# Patient Record
Sex: FEMALE | Race: WHITE | Employment: FULL TIME | URBAN - METROPOLITAN AREA
[De-identification: names, ages, dates, MRNs, and addresses within clinical notes are randomized per-mention and may not be internally consistent; named-entity substitution may affect disease eponyms.]

---

## 2022-01-31 DIAGNOSIS — Z86.39 HISTORY OF VITAMIN D DEFICIENCY: ICD-10-CM

## 2022-01-31 DIAGNOSIS — Z76.89 ENCOUNTER TO ESTABLISH CARE: ICD-10-CM

## 2022-01-31 DIAGNOSIS — R94.6 ABNORMAL THYROID FUNCTION TEST: ICD-10-CM

## 2022-01-31 LAB
A/G RATIO: 2.1 (ref 1.1–2.2)
ALBUMIN SERPL-MCNC: 4.8 G/DL (ref 3.4–5)
ALP BLD-CCNC: 82 U/L (ref 40–129)
ALT SERPL-CCNC: 18 U/L (ref 10–40)
ANION GAP SERPL CALCULATED.3IONS-SCNC: 13 MMOL/L (ref 3–16)
AST SERPL-CCNC: 18 U/L (ref 15–37)
BASOPHILS ABSOLUTE: 0.1 K/UL (ref 0–0.2)
BASOPHILS RELATIVE PERCENT: 1.8 %
BILIRUB SERPL-MCNC: 0.3 MG/DL (ref 0–1)
BUN BLDV-MCNC: 16 MG/DL (ref 7–20)
CALCIUM SERPL-MCNC: 9.9 MG/DL (ref 8.3–10.6)
CHLORIDE BLD-SCNC: 105 MMOL/L (ref 99–110)
CO2: 26 MMOL/L (ref 21–32)
CREAT SERPL-MCNC: 0.5 MG/DL (ref 0.6–1.1)
EOSINOPHILS ABSOLUTE: 0.2 K/UL (ref 0–0.6)
EOSINOPHILS RELATIVE PERCENT: 3.7 %
GFR AFRICAN AMERICAN: >60
GFR NON-AFRICAN AMERICAN: >60
GLUCOSE BLD-MCNC: 91 MG/DL (ref 70–99)
HCT VFR BLD CALC: 38.6 % (ref 36–48)
HEMOGLOBIN: 12.9 G/DL (ref 12–16)
LYMPHOCYTES ABSOLUTE: 1.8 K/UL (ref 1–5.1)
LYMPHOCYTES RELATIVE PERCENT: 40 %
MCH RBC QN AUTO: 29.2 PG (ref 26–34)
MCHC RBC AUTO-ENTMCNC: 33.5 G/DL (ref 31–36)
MCV RBC AUTO: 87.1 FL (ref 80–100)
MONOCYTES ABSOLUTE: 0.4 K/UL (ref 0–1.3)
MONOCYTES RELATIVE PERCENT: 9.9 %
NEUTROPHILS ABSOLUTE: 2 K/UL (ref 1.7–7.7)
NEUTROPHILS RELATIVE PERCENT: 44.6 %
PDW BLD-RTO: 14.7 % (ref 12.4–15.4)
PLATELET # BLD: 233 K/UL (ref 135–450)
PMV BLD AUTO: 8.5 FL (ref 5–10.5)
POTASSIUM SERPL-SCNC: 5.3 MMOL/L (ref 3.5–5.1)
RBC # BLD: 4.43 M/UL (ref 4–5.2)
SODIUM BLD-SCNC: 144 MMOL/L (ref 136–145)
TOTAL PROTEIN: 7.1 G/DL (ref 6.4–8.2)
TSH REFLEX: 1.92 UIU/ML (ref 0.27–4.2)
VITAMIN D 25-HYDROXY: 28.1 NG/ML
WBC # BLD: 4.5 K/UL (ref 4–11)

## 2023-12-07 ENCOUNTER — OFFICE VISIT (OUTPATIENT)
Dept: PRIMARY CARE CLINIC | Age: 23
End: 2023-12-07
Payer: COMMERCIAL

## 2023-12-07 VITALS
TEMPERATURE: 97.9 F | HEART RATE: 69 BPM | BODY MASS INDEX: 23.89 KG/M2 | WEIGHT: 157.6 LBS | SYSTOLIC BLOOD PRESSURE: 112 MMHG | DIASTOLIC BLOOD PRESSURE: 62 MMHG | HEIGHT: 68 IN

## 2023-12-07 DIAGNOSIS — R05.2 SUBACUTE COUGH: ICD-10-CM

## 2023-12-07 DIAGNOSIS — J30.2 SEASONAL ALLERGIES: ICD-10-CM

## 2023-12-07 DIAGNOSIS — J06.9 UPPER RESPIRATORY TRACT INFECTION, UNSPECIFIED TYPE: Primary | ICD-10-CM

## 2023-12-07 PROCEDURE — 99214 OFFICE O/P EST MOD 30 MIN: CPT | Performed by: NURSE PRACTITIONER

## 2023-12-07 RX ORDER — PREDNISONE 20 MG/1
40 TABLET ORAL DAILY
Qty: 10 TABLET | Refills: 0 | Status: SHIPPED | OUTPATIENT
Start: 2023-12-07 | End: 2023-12-12

## 2023-12-07 RX ORDER — FLUTICASONE PROPIONATE 50 MCG
1 SPRAY, SUSPENSION (ML) NASAL DAILY
Qty: 16 G | Refills: 2 | Status: SHIPPED | OUTPATIENT
Start: 2023-12-07

## 2023-12-07 ASSESSMENT — ENCOUNTER SYMPTOMS
COUGH: 1
SHORTNESS OF BREATH: 0
WHEEZING: 0

## 2023-12-07 NOTE — PROGRESS NOTES
[facility]. PRIMARY CARE  [address] [place] [id number]  Dept: [phone]  Dept Fax: [phone]     12/7/2023      Rianna Mendez   2000     Chief Complaint   Patient presents with    Allergies    Sinusitis       HPI     Patient states she started with congestion, cough about a week ago. She has post-nasal drip. She has been taking Sudafed which has helped with congestion but she is concerned about the cough since she is supposed to be in a wedding next weekend. States she is usually given a medrol dose pack which helps as she is prone to bronchitis. She also has history of seasonal allergies and feels like that has been worse this season. She is using cetirizine daily. 3/22/2023     2:23 PM 4/19/2022     8:03 AM 3/22/2022     7:56 AM 2/22/2022     9:52 AM 1/31/2022    11:11 AM   PHQ Scores   PHQ2 Score 0 0 0 0 0   PHQ9 Score 0 2 3 3 0     Interpretation of Total Score Depression Severity: 1-4 = Minimal depression, 5-9 = Mild depression, 10-14 = Moderate depression, 15-19 = Moderately severe depression, 20-27 = Severe depression     Prior to Visit Medications    Medication Sig Taking? Authorizing Provider   cetirizine (ZYRTEC) 10 MG tablet TAKE 1 TABLET BY MOUTH DAILY Yes Natividad Jalloh MD   vitamin D3 (CHOLECALCIFEROL) 25 MCG (1000 UT) TABS tablet TAKE 1 TABLET BY MOUTH DAILY Yes Natividad Jalloh MD   clindamycin (CLEOCIN T) 1 % lotion APPLY TOPICALLY TO FACE AFTER CLEANSING IN THE MORNING Yes ProviderWinifred MD   tretinoin (RETIN-A) 0.025 % cream APPLY PEA SIZED AMOUNT TOPICALLY TO THE AFFECTED AREA AT BEDTIME Yes Winifred Garcia MD   amphetamine-dextroamphetamine (ADDERALL XR) 20 MG extended release capsule Take 1 capsule by mouth every morning for 30 days. Max Daily Amount: 20 mg  NeptalierschgeovannytLyndsay DO   amphetamine-dextroamphetamine (ADDERALL, 5MG,) 5 MG tablet Take 1.5 tablets by mouth daily for 30 days.  Max Daily Amount:

## 2023-12-24 DIAGNOSIS — R09.82 PND (POST-NASAL DRIP): ICD-10-CM

## 2023-12-27 RX ORDER — MELATONIN
1000 DAILY
Qty: 90 TABLET | Refills: 1 | Status: SHIPPED | OUTPATIENT
Start: 2023-12-27

## 2023-12-27 RX ORDER — CETIRIZINE HYDROCHLORIDE 10 MG/1
10 TABLET ORAL DAILY
Qty: 90 TABLET | Refills: 1 | Status: SHIPPED | OUTPATIENT
Start: 2023-12-27

## 2024-01-14 DIAGNOSIS — F90.0 ADHD, PREDOMINANTLY INATTENTIVE TYPE: ICD-10-CM

## 2024-01-15 RX ORDER — DEXTROAMPHETAMINE SACCHARATE, AMPHETAMINE ASPARTATE, DEXTROAMPHETAMINE SULFATE AND AMPHETAMINE SULFATE 1.25; 1.25; 1.25; 1.25 MG/1; MG/1; MG/1; MG/1
7.5 TABLET ORAL DAILY
Qty: 45 TABLET | Refills: 0 | Status: SHIPPED | OUTPATIENT
Start: 2024-01-15 | End: 2024-02-14

## 2024-01-15 NOTE — TELEPHONE ENCOUNTER
Medication:   Requested Prescriptions     Pending Prescriptions Disp Refills    amphetamine-dextroamphetamine (ADDERALL, 5MG,) 5 MG tablet 45 tablet 0     Sig: Take 1.5 tablets by mouth daily for 30 days. Max Daily Amount: 7.5 mg      12/07/23  Last Filled:      Patient Phone Number: [phone] (home)     Last appt: 12/7/2023   Next appt: Visit date not found    Last OARRS:        No data to display

## 2024-01-22 DIAGNOSIS — F90.0 ADHD, PREDOMINANTLY INATTENTIVE TYPE: Primary | ICD-10-CM

## 2024-01-22 RX ORDER — DEXTROAMPHETAMINE SACCHARATE, AMPHETAMINE ASPARTATE, DEXTROAMPHETAMINE SULFATE AND AMPHETAMINE SULFATE 1.25; 1.25; 1.25; 1.25 MG/1; MG/1; MG/1; MG/1
7.5 TABLET ORAL DAILY
Qty: 15 TABLET | Refills: 0 | Status: SHIPPED | OUTPATIENT
Start: 2024-02-01 | End: 2024-02-11

## 2024-02-06 ASSESSMENT — PATIENT HEALTH QUESTIONNAIRE - PHQ9
1. LITTLE INTEREST OR PLEASURE IN DOING THINGS: 0
SUM OF ALL RESPONSES TO PHQ9 QUESTIONS 1 & 2: 0
SUM OF ALL RESPONSES TO PHQ QUESTIONS 1-9: 0
1. LITTLE INTEREST OR PLEASURE IN DOING THINGS: NOT AT ALL
2. FEELING DOWN, DEPRESSED OR HOPELESS: NOT AT ALL
2. FEELING DOWN, DEPRESSED OR HOPELESS: 0
SUM OF ALL RESPONSES TO PHQ QUESTIONS 1-9: 0

## 2024-02-07 ENCOUNTER — OFFICE VISIT (OUTPATIENT)
Dept: PRIMARY CARE CLINIC | Age: 24
End: 2024-02-07
Payer: COMMERCIAL

## 2024-02-07 VITALS
WEIGHT: 160.94 LBS | HEART RATE: 79 BPM | BODY MASS INDEX: 24.39 KG/M2 | HEIGHT: 68 IN | TEMPERATURE: 97.9 F | SYSTOLIC BLOOD PRESSURE: 109 MMHG | DIASTOLIC BLOOD PRESSURE: 68 MMHG

## 2024-02-07 DIAGNOSIS — Z11.4 ENCOUNTER FOR SCREENING FOR HUMAN IMMUNODEFICIENCY VIRUS (HIV): ICD-10-CM

## 2024-02-07 DIAGNOSIS — Z11.59 ENCOUNTER FOR HEPATITIS C SCREENING TEST FOR LOW RISK PATIENT: ICD-10-CM

## 2024-02-07 DIAGNOSIS — F90.0 ADHD, PREDOMINANTLY INATTENTIVE TYPE: ICD-10-CM

## 2024-02-07 DIAGNOSIS — E28.2 PCOS (POLYCYSTIC OVARIAN SYNDROME): ICD-10-CM

## 2024-02-07 DIAGNOSIS — Z00.00 ANNUAL PHYSICAL EXAM: Primary | ICD-10-CM

## 2024-02-07 DIAGNOSIS — Z00.00 ANNUAL PHYSICAL EXAM: ICD-10-CM

## 2024-02-07 LAB
ALBUMIN SERPL-MCNC: 4.7 G/DL (ref 3.4–5)
ALBUMIN/GLOB SERPL: 2.5 {RATIO} (ref 1.1–2.2)
ALP SERPL-CCNC: 73 U/L (ref 40–129)
ALT SERPL-CCNC: 14 U/L (ref 10–40)
ANION GAP SERPL CALCULATED.3IONS-SCNC: 10 MMOL/L (ref 3–16)
AST SERPL-CCNC: 18 U/L (ref 15–37)
BILIRUB SERPL-MCNC: 0.6 MG/DL (ref 0–1)
BUN SERPL-MCNC: 18 MG/DL (ref 7–20)
CALCIUM SERPL-MCNC: 9.2 MG/DL (ref 8.3–10.6)
CHLORIDE SERPL-SCNC: 107 MMOL/L (ref 99–110)
CHOLEST SERPL-MCNC: 177 MG/DL (ref 0–199)
CO2 SERPL-SCNC: 26 MMOL/L (ref 21–32)
CREAT SERPL-MCNC: 0.5 MG/DL (ref 0.6–1.1)
GFR SERPLBLD CREATININE-BSD FMLA CKD-EPI: >60 ML/MIN/{1.73_M2}
GLUCOSE SERPL-MCNC: 87 MG/DL (ref 70–99)
HCV AB SERPL QL IA: NORMAL
HDLC SERPL-MCNC: 69 MG/DL (ref 40–60)
LDLC SERPL CALC-MCNC: 101 MG/DL
POTASSIUM SERPL-SCNC: 4.4 MMOL/L (ref 3.5–5.1)
PROT SERPL-MCNC: 6.6 G/DL (ref 6.4–8.2)
SODIUM SERPL-SCNC: 143 MMOL/L (ref 136–145)
TRIGL SERPL-MCNC: 36 MG/DL (ref 0–150)
TSH SERPL DL<=0.005 MIU/L-ACNC: 0.96 UIU/ML (ref 0.27–4.2)
VLDLC SERPL CALC-MCNC: 7 MG/DL

## 2024-02-07 PROCEDURE — 99395 PREV VISIT EST AGE 18-39: CPT | Performed by: STUDENT IN AN ORGANIZED HEALTH CARE EDUCATION/TRAINING PROGRAM

## 2024-02-07 RX ORDER — METFORMIN HYDROCHLORIDE 500 MG/1
500 TABLET, EXTENDED RELEASE ORAL
Qty: 30 TABLET | Refills: 5 | Status: SHIPPED | OUTPATIENT
Start: 2024-02-07

## 2024-02-07 ASSESSMENT — ENCOUNTER SYMPTOMS
DIARRHEA: 0
SHORTNESS OF BREATH: 0
CONSTIPATION: 0
COUGH: 0

## 2024-02-07 NOTE — PROGRESS NOTES
[facility] Primary Care  Establish care visit   2024    Mercy Jacobs (:  2000) is a 23 y.o. female, here to establish care.    Chief Complaint   Patient presents with    Annual Exam    Medication Refill    Polycystic Ovarian Syndrome        ASSESSMENT/ PLAN  1. Annual physical exam  Screening labs ordered today if indicated, recommend 150 minutes of exercise weekly and healthy dietary choices.  Pertinent cancer screening and immunizations recommended/discussed with the patient, and orders placed or deferred per patient consent.    - Comprehensive Metabolic Panel; Future  - Hemoglobin A1C; Future  - Lipid Panel; Future    2. PCOS (polycystic ovarian syndrome)  Update labs today, discussed tracking menstrual cycle and ovulation patterns.  Implement metformin, discussed risks/benefits/side effects.  - Hemoglobin A1C; Future  - TSH with Reflex; Future  - INSULIN FREE & TOTAL; Future  - metFORMIN (GLUCOPHAGE-XR) 500 MG extended release tablet; Take 1 tablet by mouth daily (with breakfast)  Dispense: 30 tablet; Refill: 5    3. ADHD, predominantly inattentive type  Controlled, PDMP reviewed.  Continue Adderall as prescribed.    4. Encounter for hepatitis C screening test for low risk patient  - Hepatitis C Antibody; Future    5. Encounter for screening for human immunodeficiency virus (HIV)  - HIV Screen; Future       Return in about 6 months (around 2024) for ADHD, VV.    HPI  Patient presents today for annual physical exam, PCOS, ADHD.    Patient states that they are well controlled on current medication regimen; they endorse improvement in attention and focus while at school or work.  They deny insomnia, decreased appetite, palpitations or increased anxiety.    She notes that she has been diagnosed with PCOS, and is wondering how she can optimize her fertility.  She is getting , and thinking about trying to conceive within the next couple years.  Menstrual cycles have been regular for the past 2

## 2024-02-08 LAB
EST. AVERAGE GLUCOSE BLD GHB EST-MCNC: 91.1 MG/DL
HBA1C MFR BLD: 4.8 %
HIV 1+2 AB+HIV1 P24 AG SERPL QL IA: NORMAL
HIV 2 AB SERPL QL IA: NORMAL
HIV1 AB SERPL QL IA: NORMAL
HIV1 P24 AG SERPL QL IA: NORMAL

## 2024-02-09 LAB
INSULIN FREE SERPL-ACNC: 3 UIU/ML (ref 3–25)
INSULIN SERPL-ACNC: 3 UIU/ML (ref 3–25)

## 2024-02-16 DIAGNOSIS — E28.2 PCOS (POLYCYSTIC OVARIAN SYNDROME): Primary | ICD-10-CM

## 2024-02-16 RX ORDER — BLOOD-GLUCOSE SENSOR
1 EACH MISCELLANEOUS
Qty: 2 EACH | Refills: 5 | Status: SHIPPED | OUTPATIENT
Start: 2024-02-16

## 2024-02-26 DIAGNOSIS — F90.0 ADHD, PREDOMINANTLY INATTENTIVE TYPE: Primary | ICD-10-CM

## 2024-02-26 RX ORDER — DEXTROAMPHETAMINE SACCHARATE, AMPHETAMINE ASPARTATE MONOHYDRATE, DEXTROAMPHETAMINE SULFATE AND AMPHETAMINE SULFATE 5; 5; 5; 5 MG/1; MG/1; MG/1; MG/1
20 CAPSULE, EXTENDED RELEASE ORAL EVERY MORNING
Qty: 30 CAPSULE | Refills: 0 | Status: SHIPPED | OUTPATIENT
Start: 2024-02-26 | End: 2024-03-27

## 2024-03-29 NOTE — PROGRESS NOTES
PSYCHIATRY PROGRESS NOTE    Mercy Jacobs  2000  04/05/2024  Face to Face time: 30 minutes  PCP: Lyndsay Orourke DO    CC:   Chief Complaint   Patient presents with    Follow-up       Patient is a 23 y.o. female without significant past medical history who presents to the outpatient psychiatric clinic for evaluation and management of anxiety and ADHD symptoms.        A:  Patient's presentation today is indicative of some continued difficulties with ADHD symptom breakthrough despite adherence to her current regimen.  It does appear that there is a marked discrepancy between her responses to IR vs XR Adderall.  We will attempt to adjust her regimen to provide further support.    Diagnosis:  ADHD inattentive subtype predominant    P:   Increase Adderall XR to 25mg daily.   Patient was cautioned regarding adverse effects of the medication as had been described to them previously.  Increase Adderall IR to 5mg bid to supplement.  Patient was encouraged to start with just the 7.5mg dosage in the afternoon as she has been, however if the XR is noted to fail she can trial 5mg twice daily.    Medication Monitoring:    - PDMP reviewed: Adderall ER 20 mg daily 30-day supply last filled 2/29/2024.  Adderall IR 7.5 mg daily 30-day supply last filled 2/29/2024.    Follow-up: 4 weeks    Safety: Pt was counseled on the potential for increased suicidal ideations and advised on potential options for dealing with these including hotlines, calling the office, or going to the nearest emergency room.      __________________________________________________________________________    S:   Patient endorsed that she was coming back to the clinic after a couple of years of absence because her medications appear to not be working the same way now.  She notes that her IR dosage in the afternoon is still quite effective, however she finds that her XR Adderall from the morning is essentially doing nothing at this time.  Patient states

## 2024-04-05 ENCOUNTER — OFFICE VISIT (OUTPATIENT)
Dept: PSYCHIATRY | Age: 24
End: 2024-04-05
Payer: COMMERCIAL

## 2024-04-05 VITALS
WEIGHT: 157 LBS | DIASTOLIC BLOOD PRESSURE: 66 MMHG | HEART RATE: 72 BPM | SYSTOLIC BLOOD PRESSURE: 100 MMHG | BODY MASS INDEX: 23.79 KG/M2 | HEIGHT: 68 IN | OXYGEN SATURATION: 99 %

## 2024-04-05 DIAGNOSIS — F90.0 ADHD, PREDOMINANTLY INATTENTIVE TYPE: Primary | ICD-10-CM

## 2024-04-05 PROCEDURE — 99214 OFFICE O/P EST MOD 30 MIN: CPT | Performed by: STUDENT IN AN ORGANIZED HEALTH CARE EDUCATION/TRAINING PROGRAM

## 2024-04-05 RX ORDER — DEXTROAMPHETAMINE SACCHARATE, AMPHETAMINE ASPARTATE, DEXTROAMPHETAMINE SULFATE AND AMPHETAMINE SULFATE 1.25; 1.25; 1.25; 1.25 MG/1; MG/1; MG/1; MG/1
5 TABLET ORAL 2 TIMES DAILY
Qty: 60 TABLET | Refills: 0 | Status: SHIPPED | OUTPATIENT
Start: 2024-04-05 | End: 2024-05-05

## 2024-04-05 RX ORDER — DEXTROAMPHETAMINE SACCHARATE, AMPHETAMINE ASPARTATE MONOHYDRATE, DEXTROAMPHETAMINE SULFATE AND AMPHETAMINE SULFATE 6.25; 6.25; 6.25; 6.25 MG/1; MG/1; MG/1; MG/1
25 CAPSULE, EXTENDED RELEASE ORAL EVERY MORNING
Qty: 30 CAPSULE | Refills: 0 | Status: SHIPPED | OUTPATIENT
Start: 2024-04-05 | End: 2024-05-05

## 2024-04-05 ASSESSMENT — ANXIETY QUESTIONNAIRES
IF YOU CHECKED OFF ANY PROBLEMS ON THIS QUESTIONNAIRE, HOW DIFFICULT HAVE THESE PROBLEMS MADE IT FOR YOU TO DO YOUR WORK, TAKE CARE OF THINGS AT HOME, OR GET ALONG WITH OTHER PEOPLE: SOMEWHAT DIFFICULT
2. NOT BEING ABLE TO STOP OR CONTROL WORRYING: SEVERAL DAYS
6. BECOMING EASILY ANNOYED OR IRRITABLE: SEVERAL DAYS
GAD7 TOTAL SCORE: 6
5. BEING SO RESTLESS THAT IT IS HARD TO SIT STILL: MORE THAN HALF THE DAYS
4. TROUBLE RELAXING: SEVERAL DAYS
7. FEELING AFRAID AS IF SOMETHING AWFUL MIGHT HAPPEN: NOT AT ALL
1. FEELING NERVOUS, ANXIOUS, OR ON EDGE: SEVERAL DAYS
3. WORRYING TOO MUCH ABOUT DIFFERENT THINGS: NOT AT ALL

## 2024-04-05 ASSESSMENT — ENCOUNTER SYMPTOMS
RESPIRATORY NEGATIVE: 1
ALLERGIC/IMMUNOLOGIC NEGATIVE: 1
GASTROINTESTINAL NEGATIVE: 1
EYES NEGATIVE: 1

## 2024-04-05 ASSESSMENT — PATIENT HEALTH QUESTIONNAIRE - PHQ9
SUM OF ALL RESPONSES TO PHQ9 QUESTIONS 1 & 2: 0
10. IF YOU CHECKED OFF ANY PROBLEMS, HOW DIFFICULT HAVE THESE PROBLEMS MADE IT FOR YOU TO DO YOUR WORK, TAKE CARE OF THINGS AT HOME, OR GET ALONG WITH OTHER PEOPLE: SOMEWHAT DIFFICULT
2. FEELING DOWN, DEPRESSED OR HOPELESS: NOT AT ALL
1. LITTLE INTEREST OR PLEASURE IN DOING THINGS: NOT AT ALL
8. MOVING OR SPEAKING SO SLOWLY THAT OTHER PEOPLE COULD HAVE NOTICED. OR THE OPPOSITE, BEING SO FIGETY OR RESTLESS THAT YOU HAVE BEEN MOVING AROUND A LOT MORE THAN USUAL: SEVERAL DAYS
SUM OF ALL RESPONSES TO PHQ QUESTIONS 1-9: 4
5. POOR APPETITE OR OVEREATING: SEVERAL DAYS
SUM OF ALL RESPONSES TO PHQ QUESTIONS 1-9: 4
3. TROUBLE FALLING OR STAYING ASLEEP: NOT AT ALL
9. THOUGHTS THAT YOU WOULD BE BETTER OFF DEAD, OR OF HURTING YOURSELF: NOT AT ALL
SUM OF ALL RESPONSES TO PHQ QUESTIONS 1-9: 4
4. FEELING TIRED OR HAVING LITTLE ENERGY: SEVERAL DAYS
SUM OF ALL RESPONSES TO PHQ QUESTIONS 1-9: 4
7. TROUBLE CONCENTRATING ON THINGS, SUCH AS READING THE NEWSPAPER OR WATCHING TELEVISION: SEVERAL DAYS
6. FEELING BAD ABOUT YOURSELF - OR THAT YOU ARE A FAILURE OR HAVE LET YOURSELF OR YOUR FAMILY DOWN: NOT AT ALL

## 2024-05-15 DIAGNOSIS — F90.0 ADHD, PREDOMINANTLY INATTENTIVE TYPE: ICD-10-CM

## 2024-05-16 RX ORDER — DEXTROAMPHETAMINE SACCHARATE, AMPHETAMINE ASPARTATE, DEXTROAMPHETAMINE SULFATE AND AMPHETAMINE SULFATE 1.25; 1.25; 1.25; 1.25 MG/1; MG/1; MG/1; MG/1
5 TABLET ORAL 2 TIMES DAILY
Qty: 60 TABLET | Refills: 0 | Status: SHIPPED | OUTPATIENT
Start: 2024-05-16 | End: 2024-06-21

## 2024-05-16 RX ORDER — DEXTROAMPHETAMINE SACCHARATE, AMPHETAMINE ASPARTATE MONOHYDRATE, DEXTROAMPHETAMINE SULFATE AND AMPHETAMINE SULFATE 6.25; 6.25; 6.25; 6.25 MG/1; MG/1; MG/1; MG/1
25 CAPSULE, EXTENDED RELEASE ORAL EVERY MORNING
Qty: 30 CAPSULE | Refills: 0 | Status: SHIPPED | OUTPATIENT
Start: 2024-05-16 | End: 2024-06-21

## 2024-06-21 ENCOUNTER — OFFICE VISIT (OUTPATIENT)
Dept: PSYCHIATRY | Age: 24
End: 2024-06-21
Payer: COMMERCIAL

## 2024-06-21 VITALS
DIASTOLIC BLOOD PRESSURE: 64 MMHG | WEIGHT: 157 LBS | OXYGEN SATURATION: 99 % | HEIGHT: 68 IN | SYSTOLIC BLOOD PRESSURE: 102 MMHG | HEART RATE: 64 BPM | BODY MASS INDEX: 23.79 KG/M2

## 2024-06-21 DIAGNOSIS — F90.0 ADHD, PREDOMINANTLY INATTENTIVE TYPE: ICD-10-CM

## 2024-06-21 PROCEDURE — 99214 OFFICE O/P EST MOD 30 MIN: CPT | Performed by: STUDENT IN AN ORGANIZED HEALTH CARE EDUCATION/TRAINING PROGRAM

## 2024-06-21 RX ORDER — DEXTROAMPHETAMINE SACCHARATE, AMPHETAMINE ASPARTATE, DEXTROAMPHETAMINE SULFATE AND AMPHETAMINE SULFATE 1.25; 1.25; 1.25; 1.25 MG/1; MG/1; MG/1; MG/1
5 TABLET ORAL 2 TIMES DAILY
Qty: 60 TABLET | Refills: 0 | Status: SHIPPED | OUTPATIENT
Start: 2024-07-19 | End: 2024-08-18

## 2024-06-21 RX ORDER — DEXTROAMPHETAMINE SACCHARATE, AMPHETAMINE ASPARTATE MONOHYDRATE, DEXTROAMPHETAMINE SULFATE AND AMPHETAMINE SULFATE 5; 5; 5; 5 MG/1; MG/1; MG/1; MG/1
20 CAPSULE, EXTENDED RELEASE ORAL EVERY MORNING
Qty: 30 CAPSULE | Refills: 0 | Status: SHIPPED | OUTPATIENT
Start: 2024-08-16 | End: 2024-09-15

## 2024-06-21 RX ORDER — DEXTROAMPHETAMINE SACCHARATE, AMPHETAMINE ASPARTATE, DEXTROAMPHETAMINE SULFATE AND AMPHETAMINE SULFATE 1.25; 1.25; 1.25; 1.25 MG/1; MG/1; MG/1; MG/1
5 TABLET ORAL 2 TIMES DAILY
Qty: 60 TABLET | Refills: 0 | Status: SHIPPED | OUTPATIENT
Start: 2024-08-16 | End: 2024-09-15

## 2024-06-21 RX ORDER — DEXTROAMPHETAMINE SACCHARATE, AMPHETAMINE ASPARTATE MONOHYDRATE, DEXTROAMPHETAMINE SULFATE AND AMPHETAMINE SULFATE 5; 5; 5; 5 MG/1; MG/1; MG/1; MG/1
20 CAPSULE, EXTENDED RELEASE ORAL EVERY MORNING
Qty: 30 CAPSULE | Refills: 0 | Status: SHIPPED | OUTPATIENT
Start: 2024-06-21 | End: 2024-07-21

## 2024-06-21 RX ORDER — DEXTROAMPHETAMINE SACCHARATE, AMPHETAMINE ASPARTATE MONOHYDRATE, DEXTROAMPHETAMINE SULFATE AND AMPHETAMINE SULFATE 5; 5; 5; 5 MG/1; MG/1; MG/1; MG/1
20 CAPSULE, EXTENDED RELEASE ORAL EVERY MORNING
Qty: 30 CAPSULE | Refills: 0 | Status: SHIPPED | OUTPATIENT
Start: 2024-07-19 | End: 2024-08-18

## 2024-06-21 RX ORDER — DEXTROAMPHETAMINE SACCHARATE, AMPHETAMINE ASPARTATE, DEXTROAMPHETAMINE SULFATE AND AMPHETAMINE SULFATE 1.25; 1.25; 1.25; 1.25 MG/1; MG/1; MG/1; MG/1
5 TABLET ORAL 2 TIMES DAILY
Qty: 60 TABLET | Refills: 0 | Status: SHIPPED | OUTPATIENT
Start: 2024-06-21 | End: 2024-07-21

## 2024-06-21 ASSESSMENT — PATIENT HEALTH QUESTIONNAIRE - PHQ9
4. FEELING TIRED OR HAVING LITTLE ENERGY: SEVERAL DAYS
6. FEELING BAD ABOUT YOURSELF - OR THAT YOU ARE A FAILURE OR HAVE LET YOURSELF OR YOUR FAMILY DOWN: NOT AT ALL
8. MOVING OR SPEAKING SO SLOWLY THAT OTHER PEOPLE COULD HAVE NOTICED. OR THE OPPOSITE, BEING SO FIGETY OR RESTLESS THAT YOU HAVE BEEN MOVING AROUND A LOT MORE THAN USUAL: NOT AT ALL
SUM OF ALL RESPONSES TO PHQ QUESTIONS 1-9: 3
10. IF YOU CHECKED OFF ANY PROBLEMS, HOW DIFFICULT HAVE THESE PROBLEMS MADE IT FOR YOU TO DO YOUR WORK, TAKE CARE OF THINGS AT HOME, OR GET ALONG WITH OTHER PEOPLE: NOT DIFFICULT AT ALL
SUM OF ALL RESPONSES TO PHQ9 QUESTIONS 1 & 2: 0
2. FEELING DOWN, DEPRESSED OR HOPELESS: NOT AT ALL
1. LITTLE INTEREST OR PLEASURE IN DOING THINGS: NOT AT ALL
SUM OF ALL RESPONSES TO PHQ QUESTIONS 1-9: 3
SUM OF ALL RESPONSES TO PHQ QUESTIONS 1-9: 3
9. THOUGHTS THAT YOU WOULD BE BETTER OFF DEAD, OR OF HURTING YOURSELF: NOT AT ALL
3. TROUBLE FALLING OR STAYING ASLEEP: NOT AT ALL
5. POOR APPETITE OR OVEREATING: SEVERAL DAYS
SUM OF ALL RESPONSES TO PHQ QUESTIONS 1-9: 3
7. TROUBLE CONCENTRATING ON THINGS, SUCH AS READING THE NEWSPAPER OR WATCHING TELEVISION: SEVERAL DAYS

## 2024-06-21 ASSESSMENT — ANXIETY QUESTIONNAIRES
GAD7 TOTAL SCORE: 5
4. TROUBLE RELAXING: SEVERAL DAYS
7. FEELING AFRAID AS IF SOMETHING AWFUL MIGHT HAPPEN: NOT AT ALL
5. BEING SO RESTLESS THAT IT IS HARD TO SIT STILL: SEVERAL DAYS
1. FEELING NERVOUS, ANXIOUS, OR ON EDGE: SEVERAL DAYS
6. BECOMING EASILY ANNOYED OR IRRITABLE: SEVERAL DAYS
2. NOT BEING ABLE TO STOP OR CONTROL WORRYING: SEVERAL DAYS
3. WORRYING TOO MUCH ABOUT DIFFERENT THINGS: NOT AT ALL
IF YOU CHECKED OFF ANY PROBLEMS ON THIS QUESTIONNAIRE, HOW DIFFICULT HAVE THESE PROBLEMS MADE IT FOR YOU TO DO YOUR WORK, TAKE CARE OF THINGS AT HOME, OR GET ALONG WITH OTHER PEOPLE: NOT DIFFICULT AT ALL

## 2024-06-21 NOTE — PROGRESS NOTES
PSYCHIATRY PROGRESS NOTE    Mercy Jacobs  2000  06/21/2024  Face to Face time: 30 minutes  PCP: Lyndsay Orourke DO    CC:   Chief Complaint   Patient presents with    Follow-up       Patient is a 23 y.o. female without significant past medical history who presents to the outpatient psychiatric clinic for evaluation and management of anxiety and ADHD symptoms.    A:  Patient is present today is indicative of continued stability of her underlying ADHD.  He does appear that the 25 mg XR dosage may be a little bit too high for her.  As such, we will plan to lower the dosage just a little bit before transferring her back over to Dr. Orourke's care.    Diagnosis:  ADHD inattentive subtype predominant    P:   Decrease Adderall XR to 20 mg daily.  Continue current medication of Adderall IR 2.5 mg every morning and 7.5 mg every afternoon to supplement the XR.    Medication Monitoring:    - PDMP reviewed: Adderall ER 25mg daily 30-day supply filled 5/17/2024.  Adderall IR 5mg bid 30-day supply filled 5/16/2024.     Follow-up: None at this time secondary to writer's impending departure    Safety: Pt was counseled on the potential for increased suicidal ideations and advised on potential options for dealing with these including hotlines, calling the office, or going to the nearest emergency room.      __________________________________________________________________________    S:   Patient identified that she was doing okay at the moment with the exception that she was tired from an extremely long drive last night.  She noted that her medications were doing well, though she was a bit concerned about her XR 25.  Patient stated that the XR felt like it was a little bit too high.  She had actually gone and retrieved some of her remaining XR 20s that she had left over and was now using those in concert with the IR.  She states that this was actually more functional for her, with the 25's feeling like they were just a

## 2024-06-27 DIAGNOSIS — J30.2 SEASONAL ALLERGIES: ICD-10-CM

## 2024-06-27 RX ORDER — FLUTICASONE PROPIONATE 50 MCG
SPRAY, SUSPENSION (ML) NASAL
Qty: 16 G | Refills: 2 | Status: SHIPPED | OUTPATIENT
Start: 2024-06-27

## 2024-06-27 NOTE — TELEPHONE ENCOUNTER
Medication:   Requested Prescriptions     Pending Prescriptions Disp Refills    fluticasone (FLONASE) 50 MCG/ACT nasal spray [Pharmacy Med Name: FLUTICASONE 50MCG NASAL SP (120) RX] 16 g 2     Sig: SHAKE LIQUID AND USE 1 SPRAY IN EACH NOSTRIL DAILY        Last Filled:  12/07/23    Patient Phone Number: [phone] (home)     Last appt: 2/7/2024   Return in about 6 months (around 8/7/2024) for ADHD, VV.  Next appt: 8/22/2024    Last OARRS:        No data to display

## 2024-07-16 ENCOUNTER — PATIENT MESSAGE (OUTPATIENT)
Dept: PRIMARY CARE CLINIC | Age: 24
End: 2024-07-16

## 2024-07-16 DIAGNOSIS — B00.9 HERPES: ICD-10-CM

## 2024-07-16 RX ORDER — VALACYCLOVIR HYDROCHLORIDE 1 G/1
1000 TABLET, FILM COATED ORAL 3 TIMES DAILY
Qty: 21 TABLET | Refills: 3 | Status: SHIPPED | OUTPATIENT
Start: 2024-07-16 | End: 2024-07-23

## 2024-07-16 NOTE — TELEPHONE ENCOUNTER
Medication:   Requested Prescriptions     Pending Prescriptions Disp Refills    valACYclovir (VALTREX) 1 g tablet 22 tablet 3     Sig: Take 1 tablet by mouth 3 times daily for 7 days        Last Filled:  01/10/23    Patient Phone Number: [phone] (home)     Last appt: 2/7/2024   Next appt: 8/22/2024    Last OARRS:        No data to display

## 2024-07-16 NOTE — TELEPHONE ENCOUNTER
From: Mercy Jacobs  To: Dr. Lyndsay Orourke  Sent: 7/16/2024 5:49 AM EDT  Subject: Valacyclovir Refill    Hi Dr. JORGE,    I’ve taken Valacyclovir for a number of years as needed for cold sores, however it’s not showing up under my medications I can refill. Can I please get a prescription sent to [facility] for Valacyclovir? Thank you!

## 2024-08-28 SDOH — ECONOMIC STABILITY: INCOME INSECURITY: HOW HARD IS IT FOR YOU TO PAY FOR THE VERY BASICS LIKE FOOD, HOUSING, MEDICAL CARE, AND HEATING?: NOT VERY HARD

## 2024-08-28 SDOH — ECONOMIC STABILITY: TRANSPORTATION INSECURITY
IN THE PAST 12 MONTHS, HAS LACK OF TRANSPORTATION KEPT YOU FROM MEETINGS, WORK, OR FROM GETTING THINGS NEEDED FOR DAILY LIVING?: NO

## 2024-08-28 SDOH — ECONOMIC STABILITY: FOOD INSECURITY: WITHIN THE PAST 12 MONTHS, YOU WORRIED THAT YOUR FOOD WOULD RUN OUT BEFORE YOU GOT MONEY TO BUY MORE.: NEVER TRUE

## 2024-08-28 SDOH — ECONOMIC STABILITY: FOOD INSECURITY: WITHIN THE PAST 12 MONTHS, THE FOOD YOU BOUGHT JUST DIDN'T LAST AND YOU DIDN'T HAVE MONEY TO GET MORE.: NEVER TRUE

## 2024-08-29 ENCOUNTER — OFFICE VISIT (OUTPATIENT)
Dept: PRIMARY CARE CLINIC | Age: 24
End: 2024-08-29

## 2024-08-29 VITALS
BODY MASS INDEX: 23.79 KG/M2 | WEIGHT: 157 LBS | HEIGHT: 68 IN | TEMPERATURE: 97.3 F | HEART RATE: 82 BPM | SYSTOLIC BLOOD PRESSURE: 111 MMHG | DIASTOLIC BLOOD PRESSURE: 77 MMHG | OXYGEN SATURATION: 98 %

## 2024-08-29 DIAGNOSIS — F90.0 ADHD, PREDOMINANTLY INATTENTIVE TYPE: Primary | ICD-10-CM

## 2024-08-29 ASSESSMENT — ENCOUNTER SYMPTOMS
CHEST TIGHTNESS: 0
ABDOMINAL PAIN: 0
SHORTNESS OF BREATH: 0
CONSTIPATION: 0
NAUSEA: 0

## 2024-08-29 NOTE — PATIENT INSTRUCTIONS
To set up an OB/GYN appointment, google \"Dr. Katie Kapoor [facility]", click on her physician profile, and click \"New Patient Appointment.\" No referral is required.

## 2024-08-29 NOTE — PROGRESS NOTES
[facility] Primary Care  2024    Mercy Jacobs (:  2000) is a 23 y.o. female, here for evaluation of the following medical concerns:    Chief Complaint   Patient presents with    Follow-up     ADHD        ASSESSMENT/ PLAN  1. ADHD, predominantly inattentive type  Controlled, continue Adderall XR 20 mg daily and 5 mg twice daily.  PDMP reviewed.     >30 minutes spent on chart review, care coordination and patient counseling regarding disease state, lifestyle modifications and/or health maintenance screening.     Return in about 6 months (around 2025) for annual physical, ADHD.    HPI  Patient presents today for follow up on ADHD.     Patient states that they are well controlled on current ADHD medication regimen; they endorse improvement in attention and focus while at school or work.  They deny insomnia, decreased appetite, palpitations or increased anxiety.    ROS  Review of Systems   Constitutional:  Negative for activity change, appetite change, fatigue and unexpected weight change.   Respiratory:  Negative for chest tightness and shortness of breath.    Cardiovascular:  Negative for palpitations.   Gastrointestinal:  Negative for abdominal pain, constipation and nausea.   Neurological:  Negative for headaches.   Psychiatric/Behavioral:  Negative for agitation, decreased concentration, self-injury, sleep disturbance and suicidal ideas. The patient is not nervous/anxious.        HISTORIES  Current Outpatient Medications on File Prior to Visit   Medication Sig Dispense Refill    fluticasone (FLONASE) 50 MCG/ACT nasal spray SHAKE LIQUID AND USE 1 SPRAY IN EACH NOSTRIL DAILY 16 g 2    amphetamine-dextroamphetamine (ADDERALL, 5MG,) 5 MG tablet Take 1 tablet by mouth 2 times daily at 0800 and 1400 for 30 days. To be taken as supplement to the XR dosage Max Daily Amount: 10 mg 60 tablet 0    amphetamine-dextroamphetamine (ADDERALL XR) 20 MG extended release capsule Take 1 capsule by mouth every  as calculated from the following:    Height as of this encounter: 1.727 m (5' 8\").    Weight as of this encounter: 71.2 kg (157 lb).    Physical Exam  Vitals reviewed.   Constitutional:       General: She is not in acute distress.     Appearance: Normal appearance.   HENT:      Head: Normocephalic and atraumatic.   Eyes:      Extraocular Movements: Extraocular movements intact.      Pupils: Pupils are equal, round, and reactive to light.   Cardiovascular:      Rate and Rhythm: Normal rate and regular rhythm.      Pulses: Normal pulses.      Heart sounds: Normal heart sounds.   Pulmonary:      Effort: Pulmonary effort is normal.      Breath sounds: Normal breath sounds.   Abdominal:      General: Abdomen is flat. Bowel sounds are normal.      Palpations: Abdomen is soft.   Neurological:      Mental Status: She is alert.   Psychiatric:         Mood and Affect: Mood normal.         Behavior: Behavior normal.         Thought Content: Thought content normal.         Judgment: Judgment normal.         Lyndsay Orourke DO    This dictation was generated by voice recognition computer software.  Although all attempts are made to edit the dictation for accuracy, there may be errors in the transcription that are not intended.

## 2024-10-21 ENCOUNTER — TELEPHONE (OUTPATIENT)
Dept: PRIMARY CARE CLINIC | Age: 24
End: 2024-10-21

## 2024-10-22 DIAGNOSIS — F90.0 ADHD, PREDOMINANTLY INATTENTIVE TYPE: ICD-10-CM

## 2024-10-22 RX ORDER — DEXTROAMPHETAMINE SACCHARATE, AMPHETAMINE ASPARTATE, DEXTROAMPHETAMINE SULFATE AND AMPHETAMINE SULFATE 1.25; 1.25; 1.25; 1.25 MG/1; MG/1; MG/1; MG/1
5 TABLET ORAL 2 TIMES DAILY
Qty: 60 TABLET | Refills: 0 | Status: SHIPPED | OUTPATIENT
Start: 2024-10-22 | End: 2024-11-21

## 2024-10-22 NOTE — TELEPHONE ENCOUNTER
Need a script written by the PCP; we aren't allowed to use the one written by the psychiatrist.  Sorry!    Please respond to the pool ( P [facility] MEDICATION PRE-AUTH).      Thank you!

## 2024-10-22 NOTE — TELEPHONE ENCOUNTER
Submitted PA for Amphetamine-Dextroamphetamine 5MG tablets  Via CMM Key: [id number]  STATUS: APPROVED; letter attached.  Coverage Starts on: 10/22/2024 12:00:00 AM  Coverage Ends on: 10/22/2025 12:00:00 AM    If this requires a response please respond to the pool ( P MHCX PSC MEDICATION PRE-AUTH).      Please advise patient.  Thanks for your help!

## 2024-10-24 LAB — PAP SMEAR, EXTERNAL: NEGATIVE

## 2024-10-28 DIAGNOSIS — Z30.8 ENCOUNTER FOR OTHER CONTRACEPTIVE MANAGEMENT: Primary | ICD-10-CM

## 2024-11-07 ENCOUNTER — OFFICE VISIT (OUTPATIENT)
Dept: PRIMARY CARE CLINIC | Age: 24
End: 2024-11-07

## 2024-11-07 VITALS
HEIGHT: 68 IN | HEART RATE: 75 BPM | SYSTOLIC BLOOD PRESSURE: 105 MMHG | DIASTOLIC BLOOD PRESSURE: 68 MMHG | BODY MASS INDEX: 24.92 KG/M2 | TEMPERATURE: 97.5 F | WEIGHT: 164.4 LBS

## 2024-11-07 DIAGNOSIS — E28.2 PCOS (POLYCYSTIC OVARIAN SYNDROME): ICD-10-CM

## 2024-11-07 DIAGNOSIS — Z30.8 ENCOUNTER FOR OTHER CONTRACEPTIVE MANAGEMENT: Primary | ICD-10-CM

## 2024-11-07 PROBLEM — L70.8 OTHER ACNE: Status: ACTIVE | Noted: 2023-05-19

## 2024-11-07 LAB
25(OH)D3 SERPL-MCNC: 26.5 NG/ML
ALBUMIN SERPL-MCNC: 4.2 G/DL (ref 3.4–5)
ALBUMIN/GLOB SERPL: 2.1 {RATIO} (ref 1.1–2.2)
ALP SERPL-CCNC: 72 U/L (ref 40–129)
ALT SERPL-CCNC: 14 U/L (ref 10–40)
ANION GAP SERPL CALCULATED.3IONS-SCNC: 9 MMOL/L (ref 3–16)
ANTI-THYROGLOB ABS: <15 IU/ML
AST SERPL-CCNC: 17 U/L (ref 15–37)
BASOPHILS # BLD: 0.1 K/UL (ref 0–0.2)
BASOPHILS NFR BLD: 1.3 %
BILIRUB SERPL-MCNC: 0.7 MG/DL (ref 0–1)
BUN SERPL-MCNC: 16 MG/DL (ref 7–20)
CALCIUM SERPL-MCNC: 9.1 MG/DL (ref 8.3–10.6)
CHLORIDE SERPL-SCNC: 106 MMOL/L (ref 99–110)
CHOLEST SERPL-MCNC: 167 MG/DL (ref 0–199)
CO2 SERPL-SCNC: 23 MMOL/L (ref 21–32)
CORTIS AM PEAK SERPL-MCNC: 6 UG/DL (ref 4.3–22.4)
CREAT SERPL-MCNC: 0.7 MG/DL (ref 0.6–1.1)
CRP SERPL-MCNC: <3 MG/L (ref 0–5.1)
DEPRECATED RDW RBC AUTO: 13 % (ref 12.4–15.4)
EOSINOPHIL # BLD: 0.1 K/UL (ref 0–0.6)
EOSINOPHIL NFR BLD: 2.4 %
ERYTHROCYTE [SEDIMENTATION RATE] IN BLOOD BY WESTERGREN METHOD: 3 MM/HR (ref 0–20)
ESTRADIOL SERPL-MCNC: 155 PG/ML
FERRITIN SERPL IA-MCNC: 75.6 NG/ML (ref 15–150)
FOLATE SERPL-MCNC: 28.4 NG/ML (ref 4.78–24.2)
FSH SERPL-ACNC: 2.1 MIU/ML
GFR SERPLBLD CREATININE-BSD FMLA CKD-EPI: >90 ML/MIN/{1.73_M2}
GLUCOSE SERPL-MCNC: 73 MG/DL (ref 70–99)
HCT VFR BLD AUTO: 36.2 % (ref 36–48)
HCYS SERPL-SCNC: 6 UMOL/L (ref 0–10)
HDLC SERPL-MCNC: 69 MG/DL (ref 40–60)
HGB BLD-MCNC: 12.4 G/DL (ref 12–16)
IRON SATN MFR SERPL: 56 % (ref 15–50)
IRON SERPL-MCNC: 157 UG/DL (ref 37–145)
LDLC SERPL CALC-MCNC: 89 MG/DL
LH SERPL-ACNC: 5.1 MIU/ML
LYMPHOCYTES # BLD: 1.5 K/UL (ref 1–5.1)
LYMPHOCYTES NFR BLD: 36.1 %
MAGNESIUM SERPL-MCNC: 1.95 MG/DL (ref 1.8–2.4)
MCH RBC QN AUTO: 30.1 PG (ref 26–34)
MCHC RBC AUTO-ENTMCNC: 34.3 G/DL (ref 31–36)
MCV RBC AUTO: 87.6 FL (ref 80–100)
MONOCYTES # BLD: 0.4 K/UL (ref 0–1.3)
MONOCYTES NFR BLD: 8.7 %
NEUTROPHILS # BLD: 2.2 K/UL (ref 1.7–7.7)
NEUTROPHILS NFR BLD: 51.5 %
PHOSPHATE SERPL-MCNC: 3.4 MG/DL (ref 2.5–4.9)
PLATELET # BLD AUTO: 224 K/UL (ref 135–450)
PMV BLD AUTO: 8.8 FL (ref 5–10.5)
POTASSIUM SERPL-SCNC: 4 MMOL/L (ref 3.5–5.1)
PROGEST SERPL-MCNC: 11.4 NG/ML
PROT SERPL-MCNC: 6.2 G/DL (ref 6.4–8.2)
RBC # BLD AUTO: 4.13 M/UL (ref 4–5.2)
SODIUM SERPL-SCNC: 138 MMOL/L (ref 136–145)
T3FREE SERPL-MCNC: 3.2 PG/ML (ref 2.3–4.2)
T4 FREE SERPL-MCNC: 1.2 NG/DL (ref 0.9–1.8)
THYROPEROXIDASE AB SERPL IA-ACNC: 9 IU/ML
TIBC SERPL-MCNC: 281 UG/DL (ref 260–445)
TRANSFERRIN SERPL-MCNC: 240 MG/DL (ref 200–360)
TRIGL SERPL-MCNC: 45 MG/DL (ref 0–150)
TSH SERPL DL<=0.005 MIU/L-ACNC: 1.3 UIU/ML (ref 0.27–4.2)
VIT B12 SERPL-MCNC: 598 PG/ML (ref 211–911)
VLDLC SERPL CALC-MCNC: 9 MG/DL
WBC # BLD AUTO: 4.3 K/UL (ref 4–11)

## 2024-11-07 RX ORDER — VALACYCLOVIR HYDROCHLORIDE 1 G/1
1000 TABLET, FILM COATED ORAL 3 TIMES DAILY
COMMUNITY
Start: 2024-07-16

## 2024-11-07 ASSESSMENT — ENCOUNTER SYMPTOMS
SHORTNESS OF BREATH: 0
NAUSEA: 0
WHEEZING: 0

## 2024-11-07 NOTE — PROGRESS NOTES
[facility] Primary Care  2024    Mercy Jacobs (:  2000) is a 24 y.o. female, here for evaluation of the following medical concerns:    Chief Complaint   Patient presents with    Polycystic Ovarian Syndrome        ASSESSMENT/ PLAN  1. Encounter for other contraceptive management [Z30.8]  Order placed for fertility cycle tracking software-[id number]    2. PCOS (polycystic ovarian syndrome)  Uncontrolled, this is a persistent problem.  Labs placed per patient request for follow-up discussion with her nutritionist  - Hemoglobin; Future  - Estradiol; Future  - Testosterone, free, total; Future  - Comprehensive Metabolic Panel; Future  - Cortisol AM, Total; Future  - Sex Hormone Binding Globulin; Future  - Vitamin D 25 Hydroxy; Future  - LIPID PANEL; Future  - Iron and TIBC; Future  - TSH; Future  - T3, Free; Future  - T4, Free; Future  - T3, REVERSE; Future  - ANTI-THYROGLOBULIN ANTIBODY; Future  - DHEA-Sulfate; Future  - INSULIN FREE & TOTAL; Future  - Progesterone; Future  - CBC with Auto Differential; Future  - C-Reactive Protein; Future  - THYROID PEROXIDASE ANTIBODY; Future  - Sedimentation Rate; Future  - Follicle Stimulating Hormone; Future  - Luteinizing Hormone; Future  - APOLIPOPROTEIN E (APOE) CARDIOVASCULAR RISK; Future  - Ferritin; Future  - Transferrin; Future  - Vitamin B12; Future  - Folate; Future  - Phosphorus; Future  - Magnesium; Future  - Homocysteine; Future     >30 minutes spent on chart review, care coordination and patient counseling regarding disease state, lifestyle modifications and/or health maintenance screening.     Return in about 4 months (around 3/3/2025) for ADHD.    HPI  Patient presents today for follow-up on PCOS, fertility tracking prescription.    With her insurance, she can get coverage for fertility tracker for her oura ring with a prescription.  Prescription submitted today.    She has been working with a nutritionist for PCOS.  She has been tracking her blood sugars,

## 2024-11-08 LAB
DHEA-S SERPL-MCNC: 89 UG/DL (ref 98.8–340)
SHBG SERPL-SCNC: 110 NMOL/L (ref 25–122)
TESTOST FREE SERPL-MCNC: <1 PG/ML (ref 0.8–7.4)
TESTOST SERPL-MCNC: 10 NG/DL (ref 8–48)

## 2024-11-10 LAB — SHBG SERPL-SCNC: 109 NMOL/L (ref 25–122)

## 2024-11-11 LAB
INSULIN FREE SERPL-ACNC: 3 UIU/ML (ref 3–25)
INSULIN SERPL-ACNC: 3 UIU/ML (ref 3–25)
T3REVERSE SERPL-MCNC: 12.8 NG/DL (ref 9–27)

## 2024-11-12 LAB
APOLIPOPROTEIN E GENOTYPING: ABNORMAL
APOLIPOPROTEIN E SPECIMEN: ABNORMAL

## 2024-11-14 DIAGNOSIS — F90.0 ADHD, PREDOMINANTLY INATTENTIVE TYPE: ICD-10-CM

## 2024-11-14 RX ORDER — DEXTROAMPHETAMINE SACCHARATE, AMPHETAMINE ASPARTATE MONOHYDRATE, DEXTROAMPHETAMINE SULFATE AND AMPHETAMINE SULFATE 5; 5; 5; 5 MG/1; MG/1; MG/1; MG/1
20 CAPSULE, EXTENDED RELEASE ORAL EVERY MORNING
Qty: 30 CAPSULE | Refills: 0 | Status: CANCELLED | OUTPATIENT
Start: 2024-11-14 | End: 2024-12-14

## 2024-11-15 RX ORDER — DEXTROAMPHETAMINE SACCHARATE, AMPHETAMINE ASPARTATE MONOHYDRATE, DEXTROAMPHETAMINE SULFATE AND AMPHETAMINE SULFATE 3.75; 3.75; 3.75; 3.75 MG/1; MG/1; MG/1; MG/1
15 CAPSULE, EXTENDED RELEASE ORAL DAILY
Qty: 30 CAPSULE | Refills: 0 | Status: SHIPPED | OUTPATIENT
Start: 2024-11-15 | End: 2024-12-15

## 2024-11-19 ENCOUNTER — TELEPHONE (OUTPATIENT)
Dept: ADMINISTRATIVE | Age: 24
End: 2024-11-19

## 2024-11-20 NOTE — TELEPHONE ENCOUNTER
The medication is APPROVED.    Outcome  Approved on November 19 by [employer]  [place] Case: [id number], Status: Approved, Coverage Starts on: 11/19/2024 12:00:00 AM, Coverage Ends on: 11/19/2025 12:00:00 AM.  Authorization Expiration Date: 11/18/2025    If this requires a response please respond to the pool ( P MHCX PSC MEDICATION PRE-AUTH).      Thank you please advise patient.

## 2025-01-30 SDOH — ECONOMIC STABILITY: TRANSPORTATION INSECURITY
IN THE PAST 12 MONTHS, HAS THE LACK OF TRANSPORTATION KEPT YOU FROM MEDICAL APPOINTMENTS OR FROM GETTING MEDICATIONS?: NO

## 2025-01-30 SDOH — ECONOMIC STABILITY: FOOD INSECURITY: WITHIN THE PAST 12 MONTHS, YOU WORRIED THAT YOUR FOOD WOULD RUN OUT BEFORE YOU GOT MONEY TO BUY MORE.: NEVER TRUE

## 2025-01-30 SDOH — ECONOMIC STABILITY: FOOD INSECURITY: WITHIN THE PAST 12 MONTHS, THE FOOD YOU BOUGHT JUST DIDN'T LAST AND YOU DIDN'T HAVE MONEY TO GET MORE.: NEVER TRUE

## 2025-01-30 SDOH — ECONOMIC STABILITY: INCOME INSECURITY: IN THE LAST 12 MONTHS, WAS THERE A TIME WHEN YOU WERE NOT ABLE TO PAY THE MORTGAGE OR RENT ON TIME?: NO

## 2025-01-30 ASSESSMENT — PATIENT HEALTH QUESTIONNAIRE - PHQ9
SUM OF ALL RESPONSES TO PHQ QUESTIONS 1-9: 0
SUM OF ALL RESPONSES TO PHQ9 QUESTIONS 1 & 2: 0
SUM OF ALL RESPONSES TO PHQ QUESTIONS 1-9: 0
SUM OF ALL RESPONSES TO PHQ QUESTIONS 1-9: 0
1. LITTLE INTEREST OR PLEASURE IN DOING THINGS: NOT AT ALL
SUM OF ALL RESPONSES TO PHQ QUESTIONS 1-9: 0
1. LITTLE INTEREST OR PLEASURE IN DOING THINGS: NOT AT ALL
2. FEELING DOWN, DEPRESSED OR HOPELESS: NOT AT ALL
SUM OF ALL RESPONSES TO PHQ9 QUESTIONS 1 & 2: 0
2. FEELING DOWN, DEPRESSED OR HOPELESS: NOT AT ALL

## 2025-01-31 ENCOUNTER — OFFICE VISIT (OUTPATIENT)
Dept: PRIMARY CARE CLINIC | Age: 25
End: 2025-01-31

## 2025-01-31 VITALS
WEIGHT: 155 LBS | HEART RATE: 77 BPM | BODY MASS INDEX: 23.49 KG/M2 | SYSTOLIC BLOOD PRESSURE: 117 MMHG | HEIGHT: 68 IN | DIASTOLIC BLOOD PRESSURE: 65 MMHG | TEMPERATURE: 97.2 F | OXYGEN SATURATION: 100 %

## 2025-01-31 DIAGNOSIS — R42 LIGHTHEADEDNESS: ICD-10-CM

## 2025-01-31 DIAGNOSIS — R42 LIGHTHEADEDNESS: Primary | ICD-10-CM

## 2025-01-31 DIAGNOSIS — R04.0 FREQUENT NOSEBLEEDS: ICD-10-CM

## 2025-01-31 DIAGNOSIS — H61.21 IMPACTED CERUMEN OF RIGHT EAR: ICD-10-CM

## 2025-01-31 LAB
BASOPHILS # BLD: 0.1 K/UL (ref 0–0.2)
BASOPHILS NFR BLD: 1.1 %
DEPRECATED RDW RBC AUTO: 13 % (ref 12.4–15.4)
EOSINOPHIL # BLD: 0.1 K/UL (ref 0–0.6)
EOSINOPHIL NFR BLD: 1.7 %
HCT VFR BLD AUTO: 38.6 % (ref 36–48)
HGB BLD-MCNC: 13 G/DL (ref 12–16)
LYMPHOCYTES # BLD: 1.8 K/UL (ref 1–5.1)
LYMPHOCYTES NFR BLD: 34.1 %
MCH RBC QN AUTO: 29.4 PG (ref 26–34)
MCHC RBC AUTO-ENTMCNC: 33.7 G/DL (ref 31–36)
MCV RBC AUTO: 87 FL (ref 80–100)
MONOCYTES # BLD: 0.4 K/UL (ref 0–1.3)
MONOCYTES NFR BLD: 8.5 %
NEUTROPHILS # BLD: 2.9 K/UL (ref 1.7–7.7)
NEUTROPHILS NFR BLD: 54.6 %
PLATELET # BLD AUTO: 237 K/UL (ref 135–450)
PMV BLD AUTO: 9 FL (ref 5–10.5)
RBC # BLD AUTO: 4.43 M/UL (ref 4–5.2)
WBC # BLD AUTO: 5.3 K/UL (ref 4–11)

## 2025-01-31 NOTE — PROGRESS NOTES
[facility] Primary Care  2025    Mercy Jacobs (:  2000) is a 24 y.o. female, here for evaluation of the following medical concerns:    Chief Complaint   Patient presents with    Lightheadedness     3 weeks ago, nose bleeds all her life. Not fainted once in the last three weeks        ASSESSMENT/ PLAN  Assessment & Plan  Lightheadedness    Orthostatic vitals signs were negative.  Based on patient's description of the event, it sounds like patient is experiencing vasovagal syncope as she is having burning tingling and sweating before the event.  We discussed increasing water intake, element electrolyte packets, and deep breathing exercises while the events are occurring.  Patient had extensive blood work tested in November that was normal..  However her hemoglobin was 12.4.  Patient has been describing frequent nosebleeds.  Due to bleeding will recheck CBC.  Iron was okay at that time. Vasovagal syncope added to patient instructions     Orders:    CBC with Auto Differential; Future    Frequent nosebleeds    Patient has recurrent nightmares about having a nosebleed while walking down the aisle.  Patient has had 3 nosebleeds this month.  1 has been severe.  Patient given referral to ENT in [place] for evaluation for cauterization.  Advised patient to use saline nasal spray to rehydrate the mucous membranes until this appointment.     Orders:    George Schmitt MD, Otolaryngology, [facility]    Impacted cerumen of right ear    Patient was concerned about her inner ear crystals.  We did discuss that her right ear is impacted with cerumen.  It is possible that this is contributing to her symptoms.  Patient's ear was rinsed today in office.    Orders:    REMOVAL IMPACTED CERUMEN IRRIGATION/LVG UNILAT      Follow up with PCP     HPI  Patient presents as an acute appointment.  Patient describes that she had has been lightheaded the past 3 weeks. Did feel like she might pass out yesterday. She was in a

## 2025-02-03 DIAGNOSIS — F90.0 ADHD, PREDOMINANTLY INATTENTIVE TYPE: ICD-10-CM

## 2025-02-03 RX ORDER — DEXTROAMPHETAMINE SACCHARATE, AMPHETAMINE ASPARTATE MONOHYDRATE, DEXTROAMPHETAMINE SULFATE AND AMPHETAMINE SULFATE 5; 5; 5; 5 MG/1; MG/1; MG/1; MG/1
20 CAPSULE, EXTENDED RELEASE ORAL EVERY MORNING
Qty: 30 CAPSULE | Refills: 0 | Status: SHIPPED | OUTPATIENT
Start: 2025-02-03 | End: 2025-03-05

## 2025-02-03 RX ORDER — DEXTROAMPHETAMINE SACCHARATE, AMPHETAMINE ASPARTATE, DEXTROAMPHETAMINE SULFATE AND AMPHETAMINE SULFATE 1.25; 1.25; 1.25; 1.25 MG/1; MG/1; MG/1; MG/1
5 TABLET ORAL 2 TIMES DAILY
Qty: 60 TABLET | Refills: 0 | Status: SHIPPED | OUTPATIENT
Start: 2025-02-03 | End: 2025-03-05

## 2025-02-03 NOTE — TELEPHONE ENCOUNTER
Medication:   Requested Prescriptions     Pending Prescriptions Disp Refills    amphetamine-dextroamphetamine (ADDERALL XR) 20 MG extended release capsule 30 capsule 0     Sig: Take 1 capsule by mouth every morning for 30 days. Max Daily Amount: 20 mg    amphetamine-dextroamphetamine (ADDERALL, 5MG,) 5 MG tablet 60 tablet 0     Sig: Take 1 tablet by mouth 2 times daily at 0800 and 1400 for 30 days. To be taken as supplement to the XR dosage Max Daily Amount: 10 mg        Last Filled:  8/16/2024    Patient Phone Number: [phone] (home)     Last appt: 1/31/2025   Next appt: Visit date not found

## 2025-02-08 ENCOUNTER — TELEMEDICINE ON DEMAND (OUTPATIENT)
Age: 25
End: 2025-02-08
Payer: COMMERCIAL

## 2025-02-08 DIAGNOSIS — J39.2 IRRITATED THROAT: ICD-10-CM

## 2025-02-08 DIAGNOSIS — R05.1 ACUTE COUGH: Primary | ICD-10-CM

## 2025-02-08 DIAGNOSIS — R09.81 NASAL CONGESTION: ICD-10-CM

## 2025-02-08 PROCEDURE — 99213 OFFICE O/P EST LOW 20 MIN: CPT | Performed by: NURSE PRACTITIONER

## 2025-02-08 RX ORDER — BENZONATATE 200 MG/1
200 CAPSULE ORAL 3 TIMES DAILY PRN
Qty: 30 CAPSULE | Refills: 0 | Status: SHIPPED | OUTPATIENT
Start: 2025-02-08 | End: 2025-02-18

## 2025-02-08 RX ORDER — DEXTROMETHORPHAN HYDROBROMIDE AND PROMETHAZINE HYDROCHLORIDE 15; 6.25 MG/5ML; MG/5ML
5 SYRUP ORAL 4 TIMES DAILY PRN
Qty: 118 ML | Refills: 0 | Status: SHIPPED | OUTPATIENT
Start: 2025-02-08 | End: 2025-02-15

## 2025-02-08 ASSESSMENT — ENCOUNTER SYMPTOMS
SORE THROAT: 1
COUGH: 1

## 2025-02-08 NOTE — PROGRESS NOTES
Mercy Jacobs, was evaluated through a synchronous (real-time) audio-video encounter. The patient (or guardian if applicable) is aware that this is a billable service, which includes applicable co-pays. This Virtual Visit was conducted with patient's (and/or legal guardian's) consent. Patient identification was verified, and a caregiver was present when appropriate.   The patient was located at Home: [address] [place] [address].  [place] [address]  Provider was located at Home (Appt Dept State): [place]  Confirm you are appropriately licensed, registered, or certified to deliver care in the state where the patient is located as indicated above. If you are not or unsure, please re-schedule the visit: Yes, I confirm.     Mercy Jacobs (:  2000) is a Established patient, presenting virtually for evaluation of the following:    Nonproductive cough, irritated throat, nasal congestion with stuffiness since Tuesday      Below is the assessment and plan developed based on review of pertinent history, physical exam, labs, studies, and medications.    Assessment & Plan  Acute cough    Problem    Orders:    promethazine-dextromethorphan (PROMETHAZINE-DM) 6.25-15 MG/5ML syrup; Take 5 mLs by mouth 4 times daily as needed for Cough    benzonatate (TESSALON) 200 MG capsule; Take 1 capsule by mouth 3 times daily as needed for Cough  She was instructed to alternate between the cough syrup and the Tessalon Perles approximately 3 to 4 hours apart from taking each other.  She was instructed to not take these together she verbalized understanding of this information    See patient instructions    A cough is your body's response to something that bothers your throat or airways. Many things can cause a cough. You might cough because of a cold or the flu, bronchitis, or asthma. Smoking, postnasal drip, allergies, and stomach acid that backs up into your throat also can cause coughs.  A cough is a symptom, not a disease. Most coughs

## 2025-03-17 DIAGNOSIS — E28.2 PCOS (POLYCYSTIC OVARIAN SYNDROME): Primary | ICD-10-CM

## 2025-03-17 RX ORDER — METFORMIN HYDROCHLORIDE 500 MG/1
500 TABLET, EXTENDED RELEASE ORAL
Qty: 90 TABLET | Refills: 1 | Status: SHIPPED | OUTPATIENT
Start: 2025-03-17

## 2025-04-17 ENCOUNTER — OFFICE VISIT (OUTPATIENT)
Dept: ENT CLINIC | Age: 25
End: 2025-04-17
Payer: COMMERCIAL

## 2025-04-17 VITALS
TEMPERATURE: 97.7 F | BODY MASS INDEX: 23.76 KG/M2 | HEIGHT: 68 IN | SYSTOLIC BLOOD PRESSURE: 109 MMHG | DIASTOLIC BLOOD PRESSURE: 80 MMHG | HEART RATE: 80 BPM | WEIGHT: 156.8 LBS

## 2025-04-17 DIAGNOSIS — R04.0 EPISTAXIS: Primary | ICD-10-CM

## 2025-04-17 DIAGNOSIS — F90.0 ADHD, PREDOMINANTLY INATTENTIVE TYPE: ICD-10-CM

## 2025-04-17 PROCEDURE — 30901 CONTROL OF NOSEBLEED: CPT | Performed by: OTOLARYNGOLOGY

## 2025-04-17 PROCEDURE — 99202 OFFICE O/P NEW SF 15 MIN: CPT | Performed by: OTOLARYNGOLOGY

## 2025-04-17 ASSESSMENT — ENCOUNTER SYMPTOMS
EYE REDNESS: 0
RHINORRHEA: 0
SORE THROAT: 0
EYE PAIN: 0
DIARRHEA: 0
CHOKING: 0
COUGH: 0
TROUBLE SWALLOWING: 0
VOICE CHANGE: 0
EYE ITCHING: 0
SHORTNESS OF BREATH: 0
FACIAL SWELLING: 0
NAUSEA: 0
SINUS PAIN: 0
SINUS PRESSURE: 0

## 2025-04-17 NOTE — TELEPHONE ENCOUNTER
Medication:   Requested Prescriptions     Pending Prescriptions Disp Refills    amphetamine-dextroamphetamine (ADDERALL XR) 20 MG extended release capsule 30 capsule 0     Sig: Take 1 capsule by mouth every morning for 30 days. Max Daily Amount: 20 mg        Last Filled:    2/3/25  Patient Phone Number: [phone] (home)     Last appt: 1/31/2025  Return in about 4 months (around 3/3/2025) for ADHD.  Next appt: Visit date not found    Last OARRS:        No data to display

## 2025-04-17 NOTE — PATIENT INSTRUCTIONS
Dr. Ferrara Epistaxis Post-treatment    Please follow the instructions below for post treatment management of your nose bleed.     1. Do not blow your nose for 7 days.   2. Try to avoid touching you nose for 7 days.   3. Saline nasal spray, 2 sprays each nostril 5 times a day for 2 weeks.   4. Afrin nasal spray, 2 sprays in the bleeding nostril 3 times a day for 3 days.   5. Hold aspirin, ibuprofen and/or fish oil for two weeks.     If you were to have another nose bleed...  1. Afrin nasal spray, 5 sprays into the bleeding nostril.  2. Hold the soft portion of your nose with pressure for ten minutes.   3. If still bleeding repeat the above two steps three more times.   If still bleeding please go to your closest emergency room.     Dr. Ferrara Dry Nose Therapy    Please follow the instructions below for treatment management of your nasal crusting. .     1. Be a two nostril blower.   2. Do not pick nose.   3. Do not place anything in nose.  4. Extra strength nasal spray 3-5 times a day. Order from [email or web address]. ([employer] or [employer])  5. Saline nasal gel 3 times a day.   6. Bedside humidifier while sleeping.    7. Saline rinses twice a day.

## 2025-04-17 NOTE — PROGRESS NOTES
Subjective:      Patient ID: Mercy Jacobs is a 24 y.o. female.    HPI  Chief Complaint   Patient presents with    Nose bleeds  History of Present Illness:Mercy is a(n) 24 y.o. female who presents with nose bleeds. Bilateral. Right worse. October through April. Last bid one in February. Small one 2 weeks ago. Denies Asa or ibuprofen use. No easy bruising or bleeding.   Nasal Discharge: none  Nasal Obstruction: No    Post Nasal Drainage: No  Nasal Bleeding: Yes  Sense of Smell: normal  Eye Symptoms: none  Previous Treatments: none     Patient Active Problem List   Diagnosis    Asthma    Seasonal allergies    ADHD, predominantly inattentive type    PCOS (polycystic ovarian syndrome)    Other acne     Past Surgical History:   Procedure Laterality Date    TONSILLECTOMY AND ADENOIDECTOMY  2006     Family History   Problem Relation Age of Onset    Anxiety Disorder Mother     Arthritis Mother     Allergy (Severe) Father         Allergic to cats    Asthma Father     Anxiety Disorder Maternal Grandmother     Heart Defect Other     ADHD Maternal Cousin      Social History     Socioeconomic History    Marital status: Single     Spouse name: Not on file    Number of children: 0    Years of education: 16    Highest education level: Bachelor's degree (e.g., BA, AB, BS)   Occupational History    Occupation:  of computer systems     Employer: [employer]   Tobacco Use    Smoking status: Never    Smokeless tobacco: Never   Substance and Sexual Activity    Alcohol use: Yes     Alcohol/week: 3.0 - 6.0 standard drinks of alcohol     Types: 1 - 2 Glasses of wine, 2 - 4 Cans of beer per week     Comment: Socially    Drug use: Never    Sexual activity: Yes     Partners: Male     Comment: One long term partner   Other Topics Concern    Not on file   Social History Narrative    Patient indicated that she generally did well in high school and college, had previously been in gifted programs throughout school.  She achieved a 3.8 GPA

## 2025-04-18 RX ORDER — DEXTROAMPHETAMINE SACCHARATE, AMPHETAMINE ASPARTATE MONOHYDRATE, DEXTROAMPHETAMINE SULFATE AND AMPHETAMINE SULFATE 5; 5; 5; 5 MG/1; MG/1; MG/1; MG/1
20 CAPSULE, EXTENDED RELEASE ORAL EVERY MORNING
Qty: 30 CAPSULE | Refills: 0 | Status: SHIPPED | OUTPATIENT
Start: 2025-04-18 | End: 2025-05-18

## 2025-04-26 DIAGNOSIS — E28.2 PCOS (POLYCYSTIC OVARIAN SYNDROME): ICD-10-CM

## 2025-04-28 RX ORDER — ACYCLOVIR 800 MG/1
1 TABLET ORAL
Qty: 2 EACH | Refills: 5 | Status: SHIPPED | OUTPATIENT
Start: 2025-04-28

## 2025-04-28 NOTE — TELEPHONE ENCOUNTER
Medication:   Requested Prescriptions     Pending Prescriptions Disp Refills    Continuous Glucose Sensor (FREESTYLE LAILA 3 SENSOR) MISC 2 each 5     Si Units by Does not apply route every 14 days        Last Filled:  24    Patient Phone Number: [phone] (home)     Last appt: 2025   Next appt: Visit date not found    Last OARRS:        No data to display

## 2025-05-07 ENCOUNTER — TELEPHONE (OUTPATIENT)
Dept: PRIMARY CARE CLINIC | Age: 25
End: 2025-05-07

## 2025-05-07 DIAGNOSIS — E28.2 PCOS (POLYCYSTIC OVARIAN SYNDROME): ICD-10-CM

## 2025-05-07 RX ORDER — ACYCLOVIR 800 MG/1
1 TABLET ORAL
Qty: 2 EACH | Refills: 5 | Status: SHIPPED | OUTPATIENT
Start: 2025-05-07

## 2025-05-07 NOTE — TELEPHONE ENCOUNTER
Continuous Glucose Sensor (FREESTYLE ETHAN 3 SENSOR) [facility] [[id number]]    Order Details  Dose: 1 Units Route: Does not apply Frequency: EVERY 14 DAYS   Dispense Quantity: 2 each Refills: 5          Si Units by Does not apply route every 14 days         Start Date: 25 End Date: --   Written Date: 25 Expiration Date: 26       Associated Diagnoses: PCOS (polycystic ovarian syndrome) [E28.2]   Original Order: Continuous Glucose Sensor (FREESTYLE ETHAN 3 SENSOR) [facility] [[id number]]   Providers    Authorizing Provider: Lyndsay Orourke DO NPI: [id number]   Ordering User: Lyndsay Orourke DO          Pharmacy    [id number] [place] [id number] - P [phone] - F [phone]  [address] [place] [id number]  Phone: [phone]  Fax: [phone]     Order Class:    Order Class   Normal [1]     Destination    This Order   EPA REQUEST [[id number]]   E-PRESCRIBING INTERFACE [[id number]]     Action Summary    Action User: --            Warnings Override History    No Interaction Warnings Shown      Continuous Glucose Sensor (FREESTYLE ETHAN 3 SENSOR) [facility]  Date: 2025 Department: [facility]  Ordering/Authorizing: Lyndsay Orourke DO     Outpatient Medication Detail     Disp Refills Start End    Continuous Glucose Sensor (FREESTYLE ETHAN 3 SENSOR) [facility] 2 each 5 2025 --    Sig - Route: 1 Units by Does not apply route every 14 days - Does not apply    Sent to pharmacy as: FreeStyle Ethan 3 Sensor    E-Prescribing Status: Receipt confirmed by pharmacy (2025 10:33 AM EDT)    Prior authorization: Closed - Prior Authorization not required for patient/medicatio

## 2025-06-02 ENCOUNTER — PATIENT MESSAGE (OUTPATIENT)
Dept: PRIMARY CARE CLINIC | Age: 25
End: 2025-06-02

## 2025-06-02 ENCOUNTER — TELEMEDICINE (OUTPATIENT)
Dept: PRIMARY CARE CLINIC | Age: 25
End: 2025-06-02
Payer: COMMERCIAL

## 2025-06-02 DIAGNOSIS — F90.0 ADHD, PREDOMINANTLY INATTENTIVE TYPE: ICD-10-CM

## 2025-06-02 DIAGNOSIS — F90.0 ADHD, PREDOMINANTLY INATTENTIVE TYPE: Primary | ICD-10-CM

## 2025-06-02 PROCEDURE — 99214 OFFICE O/P EST MOD 30 MIN: CPT | Performed by: STUDENT IN AN ORGANIZED HEALTH CARE EDUCATION/TRAINING PROGRAM

## 2025-06-02 RX ORDER — DEXTROAMPHETAMINE SACCHARATE, AMPHETAMINE ASPARTATE, DEXTROAMPHETAMINE SULFATE AND AMPHETAMINE SULFATE 1.25; 1.25; 1.25; 1.25 MG/1; MG/1; MG/1; MG/1
5 TABLET ORAL 2 TIMES DAILY
Qty: 60 TABLET | Refills: 0 | OUTPATIENT
Start: 2025-06-02 | End: 2025-07-02

## 2025-06-02 RX ORDER — DEXTROAMPHETAMINE SACCHARATE, AMPHETAMINE ASPARTATE MONOHYDRATE, DEXTROAMPHETAMINE SULFATE AND AMPHETAMINE SULFATE 5; 5; 5; 5 MG/1; MG/1; MG/1; MG/1
20 CAPSULE, EXTENDED RELEASE ORAL EVERY MORNING
Qty: 30 CAPSULE | Refills: 0 | OUTPATIENT
Start: 2025-06-02 | End: 2025-07-02

## 2025-06-02 RX ORDER — DEXTROAMPHETAMINE SACCHARATE, AMPHETAMINE ASPARTATE, DEXTROAMPHETAMINE SULFATE AND AMPHETAMINE SULFATE 1.25; 1.25; 1.25; 1.25 MG/1; MG/1; MG/1; MG/1
5 TABLET ORAL 2 TIMES DAILY
Qty: 60 TABLET | Refills: 0 | Status: SHIPPED | OUTPATIENT
Start: 2025-06-02 | End: 2025-07-02

## 2025-06-02 ASSESSMENT — ENCOUNTER SYMPTOMS
CONSTIPATION: 0
ABDOMINAL PAIN: 0
SHORTNESS OF BREATH: 0
CHEST TIGHTNESS: 0
NAUSEA: 0

## 2025-06-02 NOTE — TELEPHONE ENCOUNTER
Medication:   Requested Prescriptions     Pending Prescriptions Disp Refills    amphetamine-dextroamphetamine (ADDERALL, 5MG,) 5 MG tablet 60 tablet 0     Sig: Take 1 tablet by mouth 2 times daily at 0800 and 1400 for 30 days. To be taken as supplement to the XR dosage Max Daily Amount: 10 mg    amphetamine-dextroamphetamine (ADDERALL XR) 20 MG extended release capsule 30 capsule 0     Sig: Take 1 capsule by mouth every morning for 30 days. Max Daily Amount: 20 mg      ,7/19/25  Last Filled:  4/18/25    Patient Phone Number: [phone] (home)     Last appt: 1/31/2025  Return in about 4 months (around 3/3/2025) for ADHD.  Next appt: Visit date not found    Last OARRS:        No data to display

## 2025-06-02 NOTE — TELEPHONE ENCOUNTER
Medication:   Requested Prescriptions     Pending Prescriptions Disp Refills    amphetamine-dextroamphetamine (ADDERALL, 5MG,) 5 MG tablet 60 tablet 0     Sig: Take 1 tablet by mouth 2 times daily at 0800 and 1400 for 30 days. To be taken as supplement to the XR dosage Max Daily Amount: 10 mg      4/18/25  Last Filled:      Patient Phone Number: [phone] (home)     Last appt: 6/2/2025  Return in about 6 months (around 12/2/2025) for annual physical, ADHD.  Next appt: Visit date not found    Last OARRS:        No data to display

## 2025-06-02 NOTE — PROGRESS NOTES
nasal spray SHAKE LIQUID AND USE 1 SPRAY IN EACH NOSTRIL DAILY (Patient taking differently: Taking PRN) 16 g 2    cetirizine (ZYRTEC) 10 MG tablet TAKE 1 TABLET BY MOUTH DAILY 90 tablet 1    vitamin D3 (CHOLECALCIFEROL) 25 MCG (1000 UT) TABS tablet TAKE 1 TABLET BY MOUTH DAILY 90 tablet 1    clindamycin (CLEOCIN T) 1 % lotion APPLY TOPICALLY TO FACE AFTER CLEANSING IN THE MORNING      tretinoin (RETIN-A) 0.025 % cream APPLY PEA SIZED AMOUNT TOPICALLY TO THE AFFECTED AREA AT BEDTIME      amphetamine-dextroamphetamine (ADDERALL XR) 20 MG extended release capsule Take 1 capsule by mouth every morning for 30 days. Max Daily Amount: 20 mg 30 capsule 0    amphetamine-dextroamphetamine (ADDERALL, 5MG,) 5 MG tablet Take 1 tablet by mouth 2 times daily at 0800 and 1400 for 30 days. To be taken as supplement to the XR dosage Max Daily Amount: 10 mg 60 tablet 0    amphetamine-dextroamphetamine (ADDERALL, 5MG,) 5 MG tablet Take 1 tablet by mouth 2 times daily at 0800 and 1400 for 30 days. To be taken as supplement to the XR dosage Max Daily Amount: 10 mg 60 tablet 0    amphetamine-dextroamphetamine (ADDERALL, 5MG,) 5 MG tablet Take 1 tablet by mouth 2 times daily at 0800 and 1400 for 30 days. To be taken as supplement to the XR dosage Max Daily Amount: 10 mg 60 tablet 0    amphetamine-dextroamphetamine (ADDERALL XR) 20 MG extended release capsule Take 1 capsule by mouth every morning for 30 days. Max Daily Amount: 20 mg 30 capsule 0    amphetamine-dextroamphetamine (ADDERALL XR) 20 MG extended release capsule Take 1 capsule by mouth every morning for 30 days. Max Daily Amount: 20 mg 30 capsule 0     No current facility-administered medications on file prior to visit.      Past Medical History:   Diagnosis Date    ADHD (attention deficit hyperactivity disorder) 2/22/2022     Patient Active Problem List   Diagnosis    Asthma    Seasonal allergies    ADHD, predominantly inattentive type    PCOS (polycystic ovarian syndrome)

## 2025-06-13 DIAGNOSIS — F90.0 ADHD, PREDOMINANTLY INATTENTIVE TYPE: ICD-10-CM

## 2025-06-16 RX ORDER — DEXTROAMPHETAMINE SACCHARATE, AMPHETAMINE ASPARTATE MONOHYDRATE, DEXTROAMPHETAMINE SULFATE AND AMPHETAMINE SULFATE 5; 5; 5; 5 MG/1; MG/1; MG/1; MG/1
20 CAPSULE, EXTENDED RELEASE ORAL EVERY MORNING
Qty: 30 CAPSULE | Refills: 0 | Status: SHIPPED | OUTPATIENT
Start: 2025-06-16 | End: 2025-07-16

## 2025-06-16 NOTE — TELEPHONE ENCOUNTER
Medication:   Requested Prescriptions     Pending Prescriptions Disp Refills    amphetamine-dextroamphetamine (ADDERALL XR) 20 MG extended release capsule 30 capsule 0     Sig: Take 1 capsule by mouth every morning for 30 days. Max Daily Amount: 20 mg        Last Filled:  4/18/2025    Patient Phone Number: [phone] (home)     Last appt: 6/2/2025   Next appt: Visit date not found        Return in about 6 months (around 12/2/2025) for annual physical, ADHD.    Patient back on unit via transport on 2 L NC with out incident 2 RN skin check done and post Vitals in progress.

## 2025-06-27 NOTE — TELEPHONE ENCOUNTER
Submitted PA for Amphetamine-Dextroamphet ER 15MG er capsules  Via [facility] [id number] STATUS: PENDING.    Follow up done daily; if no decision with in three days we will refax.  If another three days goes by with no decision will call the insurance for status.     7 (severe pain)

## 2025-06-30 ENCOUNTER — OFFICE VISIT (OUTPATIENT)
Dept: ENT CLINIC | Age: 25
End: 2025-06-30
Payer: COMMERCIAL

## 2025-06-30 VITALS
TEMPERATURE: 97.7 F | HEART RATE: 68 BPM | HEIGHT: 67 IN | WEIGHT: 154.8 LBS | SYSTOLIC BLOOD PRESSURE: 109 MMHG | DIASTOLIC BLOOD PRESSURE: 73 MMHG | BODY MASS INDEX: 24.3 KG/M2

## 2025-06-30 DIAGNOSIS — R04.0 EPISTAXIS: Primary | ICD-10-CM

## 2025-06-30 PROCEDURE — 30901 CONTROL OF NOSEBLEED: CPT | Performed by: OTOLARYNGOLOGY

## 2025-06-30 NOTE — PROGRESS NOTES
Patient here for cauterization of her left nasal septum. Had right performed in April this year. Left side now getting more frequent.     PE: Left anterior nasal septum prominent vessels     Control of Epistaxis    Pre Op: left sided Epistaxis  Post Op: Same    After consent was obtained a cottonoid soaked with lidocaine was placed in the left nasal cavity. After 5 minutes cautery was performed of the left anterior septum.  Nasal endoscopy was not needed to perform the control of bleeding. The bleeding site was not posterior.  Hemostasis was obtained. The patient tolerated well. No complications.    I attest that I was present for and did the entire procedure myself.     A/P: Follow up as needed.     Dr. Ferrara Epistaxis Post-treatment    Please follow the instructions below for post treatment management of your nose bleed.     1. Do not blow your nose for 7 days.   2. Try to avoid touching you nose for 7 days.   3. Saline nasal spray, 2 sprays each nostril 5 times a day for 2 weeks.   4. Afrin nasal spray, 2 sprays in the bleeding nostril 3 times a day for 3 days.   5. Hold aspirin, ibuprofen and/or fish oil for two weeks.     If you were to have another nose bleed...  1. Afrin nasal spray, 5 sprays into the bleeding nostril.  2. Hold the soft portion of your nose with pressure for ten minutes.   3. If still bleeding repeat the above two steps three more times.   If still bleeding please go to your closest emergency room.

## 2025-08-01 DIAGNOSIS — R05.2 SUBACUTE COUGH: Primary | ICD-10-CM

## 2025-08-01 RX ORDER — BENZONATATE 200 MG/1
200 CAPSULE ORAL 3 TIMES DAILY PRN
Qty: 30 CAPSULE | Refills: 0 | Status: SHIPPED | OUTPATIENT
Start: 2025-08-01 | End: 2025-08-11

## 2025-08-07 DIAGNOSIS — F90.0 ADHD, PREDOMINANTLY INATTENTIVE TYPE: ICD-10-CM

## 2025-08-07 RX ORDER — DEXTROAMPHETAMINE SACCHARATE, AMPHETAMINE ASPARTATE, DEXTROAMPHETAMINE SULFATE AND AMPHETAMINE SULFATE 1.25; 1.25; 1.25; 1.25 MG/1; MG/1; MG/1; MG/1
5 TABLET ORAL 2 TIMES DAILY
Qty: 60 TABLET | Refills: 0 | Status: SHIPPED | OUTPATIENT
Start: 2025-08-07 | End: 2025-09-06

## 2025-08-07 RX ORDER — DEXTROAMPHETAMINE SACCHARATE, AMPHETAMINE ASPARTATE MONOHYDRATE, DEXTROAMPHETAMINE SULFATE AND AMPHETAMINE SULFATE 5; 5; 5; 5 MG/1; MG/1; MG/1; MG/1
20 CAPSULE, EXTENDED RELEASE ORAL EVERY MORNING
Qty: 30 CAPSULE | Refills: 0 | Status: SHIPPED | OUTPATIENT
Start: 2025-08-07 | End: 2025-09-06

## 2025-08-29 DIAGNOSIS — R09.82 PND (POST-NASAL DRIP): ICD-10-CM

## 2025-08-31 DIAGNOSIS — F90.0 ADHD, PREDOMINANTLY INATTENTIVE TYPE: ICD-10-CM

## 2025-09-01 RX ORDER — CETIRIZINE HYDROCHLORIDE 10 MG/1
10 TABLET ORAL DAILY
Qty: 90 TABLET | Refills: 3 | Status: SHIPPED | OUTPATIENT
Start: 2025-09-01

## 2025-09-02 RX ORDER — DEXTROAMPHETAMINE SACCHARATE, AMPHETAMINE ASPARTATE, DEXTROAMPHETAMINE SULFATE AND AMPHETAMINE SULFATE 1.25; 1.25; 1.25; 1.25 MG/1; MG/1; MG/1; MG/1
5 TABLET ORAL 2 TIMES DAILY
Qty: 60 TABLET | Refills: 0 | Status: SHIPPED | OUTPATIENT
Start: 2025-09-02 | End: 2025-10-02

## 2025-09-02 RX ORDER — DEXTROAMPHETAMINE SACCHARATE, AMPHETAMINE ASPARTATE MONOHYDRATE, DEXTROAMPHETAMINE SULFATE AND AMPHETAMINE SULFATE 5; 5; 5; 5 MG/1; MG/1; MG/1; MG/1
20 CAPSULE, EXTENDED RELEASE ORAL EVERY MORNING
Qty: 30 CAPSULE | Refills: 0 | Status: SHIPPED | OUTPATIENT
Start: 2025-09-02 | End: 2025-10-02

## 2025-09-04 DIAGNOSIS — R11.0 NAUSEA: Primary | ICD-10-CM

## 2025-09-04 RX ORDER — ONDANSETRON 4 MG/1
4 TABLET, ORALLY DISINTEGRATING ORAL 3 TIMES DAILY PRN
Qty: 21 TABLET | Refills: 0 | Status: SHIPPED | OUTPATIENT
Start: 2025-09-04

## 2025-09-05 ENCOUNTER — TELEPHONE (OUTPATIENT)
Dept: PRIMARY CARE CLINIC | Age: 25
End: 2025-09-05